# Patient Record
Sex: FEMALE | Race: WHITE | NOT HISPANIC OR LATINO
[De-identification: names, ages, dates, MRNs, and addresses within clinical notes are randomized per-mention and may not be internally consistent; named-entity substitution may affect disease eponyms.]

---

## 2019-10-21 ENCOUNTER — RECORD ABSTRACTING (OUTPATIENT)
Age: 19
End: 2019-10-21

## 2019-10-21 DIAGNOSIS — Z92.89 PERSONAL HISTORY OF OTHER MEDICAL TREATMENT: ICD-10-CM

## 2019-10-21 DIAGNOSIS — Z87.42 PERSONAL HISTORY OF OTHER DISEASES OF THE FEMALE GENITAL TRACT: ICD-10-CM

## 2019-10-21 DIAGNOSIS — Z87.2 PERSONAL HISTORY OF DISEASES OF THE SKIN AND SUBCUTANEOUS TISSUE: ICD-10-CM

## 2019-10-21 DIAGNOSIS — I73.00 RAYNAUD'S SYNDROME W/OUT GANGRENE: ICD-10-CM

## 2019-10-21 DIAGNOSIS — Z83.2 FAMILY HISTORY OF DISEASES OF THE BLOOD AND BLOOD-FORMING ORGANS AND CERTAIN DISORDERS INVOLVING THE IMMUNE MECHANISM: ICD-10-CM

## 2019-10-21 DIAGNOSIS — N91.2 AMENORRHEA, UNSPECIFIED: ICD-10-CM

## 2019-10-21 DIAGNOSIS — Z80.3 FAMILY HISTORY OF MALIGNANT NEOPLASM OF BREAST: ICD-10-CM

## 2019-10-21 PROBLEM — Z00.00 ENCOUNTER FOR PREVENTIVE HEALTH EXAMINATION: Status: ACTIVE | Noted: 2019-10-21

## 2019-10-21 RX ORDER — NORGESTIMATE AND ETHINYL ESTRADIOL 7DAYSX3 28
KIT ORAL
Refills: 0 | Status: ACTIVE | COMMUNITY

## 2020-04-08 ENCOUNTER — RX RENEWAL (OUTPATIENT)
Age: 20
End: 2020-04-08

## 2020-05-20 ENCOUNTER — TRANSCRIPTION ENCOUNTER (OUTPATIENT)
Age: 20
End: 2020-05-20

## 2020-07-14 ENCOUNTER — APPOINTMENT (OUTPATIENT)
Dept: OBGYN | Facility: CLINIC | Age: 20
End: 2020-07-14
Payer: COMMERCIAL

## 2020-07-14 VITALS
DIASTOLIC BLOOD PRESSURE: 70 MMHG | SYSTOLIC BLOOD PRESSURE: 102 MMHG | HEIGHT: 64 IN | BODY MASS INDEX: 25.95 KG/M2 | TEMPERATURE: 98.3 F | HEART RATE: 68 BPM | WEIGHT: 152 LBS

## 2020-07-14 DIAGNOSIS — Z78.9 OTHER SPECIFIED HEALTH STATUS: ICD-10-CM

## 2020-07-14 PROCEDURE — 99385 PREV VISIT NEW AGE 18-39: CPT

## 2020-07-14 NOTE — CHIEF COMPLAINT
[Annual Visit] : annual visit [FreeTextEntry1] : Patient is here for annual exam , on birth control pills  doing well , needs refills to [facility]  , family hx breast Ca mom with BRCA - negative , no complaints

## 2020-07-14 NOTE — HISTORY OF PRESENT ILLNESS
[Definite:  ___ (Date)] : the last menstrual period was [unfilled] [Menarche Age: ____] : age at menarche was [unfilled] [Oral Contraceptives] : uses oral contraceptives [Contraception] : uses contraception [Yes] : yes [1 Year Ago] : 1 year ago [Good] : being in good health [Reproductive Age] : is of reproductive age [Up to Date] : up to date with ~his/her~ immunizations [Sexually Active] : is not sexually active

## 2020-07-14 NOTE — DISCUSSION/SUMMARY
[FreeTextEntry1] : Patient here for annual exam. Doing well on oral contraceptives no complaints.\par \par Mei Rankin M.D.\par

## 2020-07-16 LAB
C TRACH RRNA SPEC QL NAA+PROBE: NOT DETECTED
HPV HIGH+LOW RISK DNA PNL CVX: NOT DETECTED
N GONORRHOEA RRNA SPEC QL NAA+PROBE: NOT DETECTED
SOURCE AMPLIFICATION: NORMAL

## 2021-01-20 RX ORDER — NORGESTIMATE AND ETHINYL ESTRADIOL 7DAYSX3 28
0.18/0.215/0.25 KIT ORAL
Qty: 1 | Refills: 0 | Status: ACTIVE | COMMUNITY
Start: 2021-01-20 | End: 1900-01-01

## 2021-01-20 RX ORDER — NORGESTIMATE AND ETHINYL ESTRADIOL 7DAYSX3 28
0.18/0.215/0.25 KIT ORAL DAILY
Qty: 3 | Refills: 1 | Status: ACTIVE | COMMUNITY
Start: 2021-01-20 | End: 1900-01-01

## 2021-06-28 ENCOUNTER — TRANSCRIPTION ENCOUNTER (OUTPATIENT)
Age: 21
End: 2021-06-28

## 2021-07-22 ENCOUNTER — NON-APPOINTMENT (OUTPATIENT)
Age: 21
End: 2021-07-22

## 2021-07-22 ENCOUNTER — APPOINTMENT (OUTPATIENT)
Dept: OBGYN | Facility: CLINIC | Age: 21
End: 2021-07-22
Payer: COMMERCIAL

## 2021-07-22 VITALS
SYSTOLIC BLOOD PRESSURE: 118 MMHG | BODY MASS INDEX: 25.1 KG/M2 | WEIGHT: 147 LBS | HEART RATE: 69 BPM | HEIGHT: 64 IN | DIASTOLIC BLOOD PRESSURE: 78 MMHG | TEMPERATURE: 98 F

## 2021-07-22 DIAGNOSIS — Z80.0 FAMILY HISTORY OF MALIGNANT NEOPLASM OF DIGESTIVE ORGANS: ICD-10-CM

## 2021-07-22 PROCEDURE — 99395 PREV VISIT EST AGE 18-39: CPT

## 2021-07-22 RX ORDER — NORGESTIMATE AND ETHINYL ESTRADIOL 7DAYSX3 28
0.18/0.215/0.25 KIT ORAL
Qty: 84 | Refills: 3 | Status: ACTIVE | COMMUNITY
Start: 2020-04-08 | End: 1900-01-01

## 2021-07-22 NOTE — DISCUSSION/SUMMARY
[FreeTextEntry1] : 20 yo p0 for annual exam , ocp refill,  h/o irregular periods\par pap hpv\par  ocp refill\par

## 2021-07-22 NOTE — HISTORY OF PRESENT ILLNESS
[Patient reported PAP Smear was normal] : Patient reported PAP Smear was normal [Menarche Age: ____] : age at menarche was [unfilled] [No] : Patient does not have concerns regarding sex [Currently Active] : currently active [Y] : Patient is sexually active [N] : Patient denies prior pregnancies [TextBox_4] : gynhx\par  no h/o firboids cyst or std\par  11/irreg q 2-4 mo, regular on ocp - / lasting 4 days\par  on ocp since 2018\par gardasil vaccine\par  mother  BRCA neg ( had br cancer double mastectomy at 40's, grandpa colon ca in 40's)\par  [PapSmeardate] : 7-2020 [PGHxTotal] : 0 [PGxLiving] : o [FreeTextEntry1] : 06-

## 2021-07-22 NOTE — PHYSICAL EXAM
[Appropriately responsive] : appropriately responsive [Alert] : alert [No Acute Distress] : no acute distress [No Lymphadenopathy] : no lymphadenopathy [Soft] : soft [Non-tender] : non-tender [Non-distended] : non-distended [No HSM] : No HSM [No Lesions] : no lesions [No Mass] : no mass [Oriented x3] : oriented x3 [FreeTextEntry6] : wnl [Examination Of The Breasts] : a normal appearance [No Discharge] : no discharge [No Masses] : no breast masses were palpable [Labia Majora] : normal [Labia Minora] : normal [Normal] : normal [Uterine Adnexae] : normal [FreeTextEntry5] : white discharge, very limited exam with pediatric speculum , single digit pelvic exam ,limited as patient is voluntary guarding, virgin- she has no complains

## 2021-07-27 LAB — HPV HIGH+LOW RISK DNA PNL CVX: NOT DETECTED

## 2021-08-02 LAB — CYTOLOGY CVX/VAG DOC THIN PREP: NORMAL

## 2021-11-03 ENCOUNTER — TRANSCRIPTION ENCOUNTER (OUTPATIENT)
Age: 21
End: 2021-11-03

## 2022-06-07 ENCOUNTER — RX RENEWAL (OUTPATIENT)
Age: 22
End: 2022-06-07

## 2022-10-08 ENCOUNTER — EMERGENCY (EMERGENCY)
Facility: HOSPITAL | Age: 22
LOS: 0 days | Discharge: HOME | End: 2022-10-08
Attending: EMERGENCY MEDICINE | Admitting: EMERGENCY MEDICINE

## 2022-10-08 VITALS
SYSTOLIC BLOOD PRESSURE: 117 MMHG | RESPIRATION RATE: 18 BRPM | HEART RATE: 138 BPM | DIASTOLIC BLOOD PRESSURE: 62 MMHG | OXYGEN SATURATION: 99 % | WEIGHT: 147.05 LBS | TEMPERATURE: 103 F

## 2022-10-08 VITALS
RESPIRATION RATE: 18 BRPM | HEART RATE: 99 BPM | TEMPERATURE: 100 F | SYSTOLIC BLOOD PRESSURE: 109 MMHG | DIASTOLIC BLOOD PRESSURE: 60 MMHG | OXYGEN SATURATION: 96 %

## 2022-10-08 DIAGNOSIS — R00.0 TACHYCARDIA, UNSPECIFIED: ICD-10-CM

## 2022-10-08 DIAGNOSIS — R42 DIZZINESS AND GIDDINESS: ICD-10-CM

## 2022-10-08 DIAGNOSIS — N39.0 URINARY TRACT INFECTION, SITE NOT SPECIFIED: ICD-10-CM

## 2022-10-08 DIAGNOSIS — R50.9 FEVER, UNSPECIFIED: ICD-10-CM

## 2022-10-08 LAB
ALBUMIN SERPL ELPH-MCNC: 4.2 G/DL — SIGNIFICANT CHANGE UP (ref 3.5–5.2)
ALP SERPL-CCNC: 37 U/L — SIGNIFICANT CHANGE UP (ref 30–115)
ALT FLD-CCNC: 28 U/L — SIGNIFICANT CHANGE UP (ref 0–41)
ANION GAP SERPL CALC-SCNC: 12 MMOL/L — SIGNIFICANT CHANGE UP (ref 7–14)
APPEARANCE UR: ABNORMAL
AST SERPL-CCNC: 41 U/L — SIGNIFICANT CHANGE UP (ref 0–41)
BACTERIA # UR AUTO: ABNORMAL
BASOPHILS # BLD AUTO: 0.01 K/UL — SIGNIFICANT CHANGE UP (ref 0–0.2)
BASOPHILS NFR BLD AUTO: 0.2 % — SIGNIFICANT CHANGE UP (ref 0–1)
BILIRUB SERPL-MCNC: 0.4 MG/DL — SIGNIFICANT CHANGE UP (ref 0.2–1.2)
BILIRUB UR-MCNC: NEGATIVE — SIGNIFICANT CHANGE UP
BUN SERPL-MCNC: 10 MG/DL — SIGNIFICANT CHANGE UP (ref 10–20)
CALCIUM SERPL-MCNC: 8.8 MG/DL — SIGNIFICANT CHANGE UP (ref 8.4–10.5)
CHLORIDE SERPL-SCNC: 99 MMOL/L — SIGNIFICANT CHANGE UP (ref 98–110)
CO2 SERPL-SCNC: 24 MMOL/L — SIGNIFICANT CHANGE UP (ref 17–32)
COLOR SPEC: SIGNIFICANT CHANGE UP
CREAT SERPL-MCNC: 1 MG/DL — SIGNIFICANT CHANGE UP (ref 0.7–1.5)
DIFF PNL FLD: ABNORMAL
EGFR: 81 ML/MIN/1.73M2 — SIGNIFICANT CHANGE UP
EOSINOPHIL # BLD AUTO: 0 K/UL — SIGNIFICANT CHANGE UP (ref 0–0.7)
EOSINOPHIL NFR BLD AUTO: 0 % — SIGNIFICANT CHANGE UP (ref 0–8)
EPI CELLS # UR: 3 /HPF — SIGNIFICANT CHANGE UP (ref 0–5)
GLUCOSE SERPL-MCNC: 124 MG/DL — HIGH (ref 70–99)
GLUCOSE UR QL: NEGATIVE — SIGNIFICANT CHANGE UP
HCG SERPL QL: NEGATIVE — SIGNIFICANT CHANGE UP
HCT VFR BLD CALC: 37.7 % — SIGNIFICANT CHANGE UP (ref 37–47)
HGB BLD-MCNC: 12.2 G/DL — SIGNIFICANT CHANGE UP (ref 12–16)
HYALINE CASTS # UR AUTO: 4 /LPF — SIGNIFICANT CHANGE UP (ref 0–7)
IMM GRANULOCYTES NFR BLD AUTO: 0.4 % — HIGH (ref 0.1–0.3)
KETONES UR-MCNC: NEGATIVE — SIGNIFICANT CHANGE UP
LEUKOCYTE ESTERASE UR-ACNC: ABNORMAL
LYMPHOCYTES # BLD AUTO: 0.38 K/UL — LOW (ref 1.2–3.4)
LYMPHOCYTES # BLD AUTO: 7.9 % — LOW (ref 20.5–51.1)
MCHC RBC-ENTMCNC: 23.4 PG — LOW (ref 27–31)
MCHC RBC-ENTMCNC: 32.4 G/DL — SIGNIFICANT CHANGE UP (ref 32–37)
MCV RBC AUTO: 72.2 FL — LOW (ref 81–99)
MONOCYTES # BLD AUTO: 0.34 K/UL — SIGNIFICANT CHANGE UP (ref 0.1–0.6)
MONOCYTES NFR BLD AUTO: 7.1 % — SIGNIFICANT CHANGE UP (ref 1.7–9.3)
NEUTROPHILS # BLD AUTO: 4.04 K/UL — SIGNIFICANT CHANGE UP (ref 1.4–6.5)
NEUTROPHILS NFR BLD AUTO: 84.4 % — HIGH (ref 42.2–75.2)
NITRITE UR-MCNC: NEGATIVE — SIGNIFICANT CHANGE UP
NRBC # BLD: 0 /100 WBCS — SIGNIFICANT CHANGE UP (ref 0–0)
PH UR: 7 — SIGNIFICANT CHANGE UP (ref 5–8)
PLATELET # BLD AUTO: 190 K/UL — SIGNIFICANT CHANGE UP (ref 130–400)
POTASSIUM SERPL-MCNC: 4.4 MMOL/L — SIGNIFICANT CHANGE UP (ref 3.5–5)
POTASSIUM SERPL-SCNC: 4.4 MMOL/L — SIGNIFICANT CHANGE UP (ref 3.5–5)
PROT SERPL-MCNC: 6.8 G/DL — SIGNIFICANT CHANGE UP (ref 6–8)
PROT UR-MCNC: SIGNIFICANT CHANGE UP
RBC # BLD: 5.22 M/UL — SIGNIFICANT CHANGE UP (ref 4.2–5.4)
RBC # FLD: 21.1 % — HIGH (ref 11.5–14.5)
RBC CASTS # UR COMP ASSIST: 10 /HPF — HIGH (ref 0–4)
SODIUM SERPL-SCNC: 135 MMOL/L — SIGNIFICANT CHANGE UP (ref 135–146)
SP GR SPEC: 1.01 — SIGNIFICANT CHANGE UP (ref 1.01–1.03)
TROPONIN T SERPL-MCNC: <0.01 NG/ML — SIGNIFICANT CHANGE UP
UROBILINOGEN FLD QL: SIGNIFICANT CHANGE UP
WBC # BLD: 4.79 K/UL — LOW (ref 4.8–10.8)
WBC # FLD AUTO: 4.79 K/UL — LOW (ref 4.8–10.8)
WBC UR QL: 23 /HPF — HIGH (ref 0–5)

## 2022-10-08 PROCEDURE — 99285 EMERGENCY DEPT VISIT HI MDM: CPT

## 2022-10-08 PROCEDURE — 93010 ELECTROCARDIOGRAM REPORT: CPT

## 2022-10-08 PROCEDURE — 71045 X-RAY EXAM CHEST 1 VIEW: CPT | Mod: 26

## 2022-10-08 RX ORDER — CEFDINIR 250 MG/5ML
1 POWDER, FOR SUSPENSION ORAL
Qty: 14 | Refills: 0
Start: 2022-10-08 | End: 2022-10-14

## 2022-10-08 RX ORDER — ACETAMINOPHEN 500 MG
650 TABLET ORAL ONCE
Refills: 0 | Status: COMPLETED | OUTPATIENT
Start: 2022-10-08 | End: 2022-10-08

## 2022-10-08 RX ORDER — SODIUM CHLORIDE 9 MG/ML
1000 INJECTION, SOLUTION INTRAVENOUS ONCE
Refills: 0 | Status: COMPLETED | OUTPATIENT
Start: 2022-10-08 | End: 2022-10-08

## 2022-10-08 RX ORDER — CEFTRIAXONE 500 MG/1
1000 INJECTION, POWDER, FOR SOLUTION INTRAMUSCULAR; INTRAVENOUS ONCE
Refills: 0 | Status: COMPLETED | OUTPATIENT
Start: 2022-10-08 | End: 2022-10-08

## 2022-10-08 RX ADMIN — Medication 650 MILLIGRAM(S): at 13:00

## 2022-10-08 RX ADMIN — SODIUM CHLORIDE 1000 MILLILITER(S): 9 INJECTION, SOLUTION INTRAVENOUS at 10:31

## 2022-10-08 RX ADMIN — CEFTRIAXONE 100 MILLIGRAM(S): 500 INJECTION, POWDER, FOR SOLUTION INTRAMUSCULAR; INTRAVENOUS at 13:30

## 2022-10-08 RX ADMIN — Medication 650 MILLIGRAM(S): at 10:31

## 2022-10-08 NOTE — ED PROVIDER NOTE - CLINICAL SUMMARY MEDICAL DECISION MAKING FREE TEXT BOX
22-year-old female without any pertinent past medical history presented for evaluation of fever for the past 2 days.  Patient denies any additional symptoms, says that she has been eating and drinking less for the past day, this morning got up from bed felt lightheaded went to the bathroom and felt like passing out, her mom caught her sat on a chair and told her to put her head between the legs.  Patient states that her urine is somewhat dark in color but denies any headache, sore throat, congestion, cough, shortness of breath, CP, abdominal /flank pain, dysuria/hematuria frequency or urgency, vaginal discharge, skin rash or any other acute complaints.  No recent travel, her sister had URI-like symptoms which have resolved.   Well-appearing well-nourished, NAD, head AT/NC, PERRL, pink conjunctivae,  mmm, nml oropharynx, nml phonation without drooling or trismus, supple neck , nml work of breathing, lungs CTA b/l, equal air entry, RRR, well-perfused extremities, distal pulses intact, abdomen soft, NT/ND, BS present in all quadrants, no midline spine or CVA ttp, no leg edema or unilateral calf swelling, A&Ox3, no focal neuro deficits, nml mood and affect.  Patient was found to be febrile in ED, Plan: IVF hydration, antipyretics, labs, reassess.  Patient and her father are amenable to the plan.

## 2022-10-08 NOTE — ED PROVIDER NOTE - PROGRESS NOTE DETAILS
EP: The patient appears very well, appears positive, antibiotics given, she is a good candidate for outpatient antibiotic treatment.  Strict return precautions given, advised to follow with her primary care doctor in 1-4 days. Patient to be discharged from ED. Any available test results were discussed with patient and/or family. Verbal instructions given, including instructions to return to ED immediately for any new, worsening, or concerning symptoms. Patient endorsed understanding. Written discharge instructions additionally given, including follow-up plan.  Patient was given opportunity to ask questions.

## 2022-10-08 NOTE — ED ADULT TRIAGE NOTE - CHIEF COMPLAINT QUOTE
pt stated she has had fevers since thursday night, highest 103. pt with no other symptoms. pt stated she passed out this morning and felt dizzy with the fever.

## 2022-10-08 NOTE — ED ADULT NURSE NOTE - NSIMPLEMENTINTERV_GEN_ALL_ED
Implemented All Universal Safety Interventions:  [place] to call system. Call bell, personal items and telephone within reach. Instruct patient to call for assistance. Room bathroom lighting operational. Non-slip footwear when patient is off stretcher. Physically safe environment: no spills, clutter or unnecessary equipment. Stretcher in lowest position, wheels locked, appropriate side rails in place.

## 2022-10-08 NOTE — ED PROVIDER NOTE - PATIENT PORTAL LINK FT
You can access the FollowMyHealth Patient Portal offered by [facility] by registering at the following website: [email or web address]. By joining [employer]’s FollowMyHealth portal, you will also be able to view your health information using other applications (apps) compatible with our system.

## 2022-10-08 NOTE — ED PROVIDER NOTE - OBJECTIVE STATEMENT
22-year-old female coming in here for fever and lightheadedness.  She states that she has had fever for the past 2 days.  Denies chest pain shortness of breath headaches nausea vomiting or urinary complaints.  Patient states that she was walking to the bathroom but then felt lightheaded and had to sit down.  Patient never fell or hit her head.  No other complaints.

## 2022-10-18 ENCOUNTER — NON-APPOINTMENT (OUTPATIENT)
Age: 22
End: 2022-10-18

## 2023-03-05 ENCOUNTER — EMERGENCY (EMERGENCY)
Facility: HOSPITAL | Age: 23
LOS: 0 days | Discharge: HOME | End: 2023-03-05
Attending: EMERGENCY MEDICINE
Payer: COMMERCIAL

## 2023-03-05 VITALS
DIASTOLIC BLOOD PRESSURE: 66 MMHG | HEART RATE: 76 BPM | OXYGEN SATURATION: 100 % | TEMPERATURE: 98 F | RESPIRATION RATE: 18 BRPM | SYSTOLIC BLOOD PRESSURE: 128 MMHG

## 2023-03-05 VITALS
TEMPERATURE: 99 F | HEIGHT: 64 IN | DIASTOLIC BLOOD PRESSURE: 83 MMHG | HEART RATE: 110 BPM | RESPIRATION RATE: 17 BRPM | OXYGEN SATURATION: 99 % | WEIGHT: 138.01 LBS | SYSTOLIC BLOOD PRESSURE: 138 MMHG

## 2023-03-05 DIAGNOSIS — R10.2 PELVIC AND PERINEAL PAIN: ICD-10-CM

## 2023-03-05 DIAGNOSIS — N93.9 ABNORMAL UTERINE AND VAGINAL BLEEDING, UNSPECIFIED: ICD-10-CM

## 2023-03-05 DIAGNOSIS — R10.9 UNSPECIFIED ABDOMINAL PAIN: ICD-10-CM

## 2023-03-05 DIAGNOSIS — Z79.3 LONG TERM (CURRENT) USE OF HORMONAL CONTRACEPTIVES: ICD-10-CM

## 2023-03-05 LAB
ALBUMIN SERPL ELPH-MCNC: 4.3 G/DL — SIGNIFICANT CHANGE UP (ref 3.5–5.2)
ALP SERPL-CCNC: 38 U/L — SIGNIFICANT CHANGE UP (ref 30–115)
ALT FLD-CCNC: 14 U/L — SIGNIFICANT CHANGE UP (ref 0–41)
ANION GAP SERPL CALC-SCNC: 9 MMOL/L — SIGNIFICANT CHANGE UP (ref 7–14)
APPEARANCE UR: CLEAR — SIGNIFICANT CHANGE UP
AST SERPL-CCNC: 16 U/L — SIGNIFICANT CHANGE UP (ref 0–41)
BACTERIA # UR AUTO: NEGATIVE — SIGNIFICANT CHANGE UP
BASOPHILS # BLD AUTO: 0.01 K/UL — SIGNIFICANT CHANGE UP (ref 0–0.2)
BASOPHILS NFR BLD AUTO: 0.1 % — SIGNIFICANT CHANGE UP (ref 0–1)
BILIRUB SERPL-MCNC: 0.5 MG/DL — SIGNIFICANT CHANGE UP (ref 0.2–1.2)
BILIRUB UR-MCNC: NEGATIVE — SIGNIFICANT CHANGE UP
BUN SERPL-MCNC: 8 MG/DL — LOW (ref 10–20)
CALCIUM SERPL-MCNC: 9.7 MG/DL — SIGNIFICANT CHANGE UP (ref 8.4–10.5)
CHLORIDE SERPL-SCNC: 102 MMOL/L — SIGNIFICANT CHANGE UP (ref 98–110)
CO2 SERPL-SCNC: 26 MMOL/L — SIGNIFICANT CHANGE UP (ref 17–32)
COLOR SPEC: COLORLESS — SIGNIFICANT CHANGE UP
CREAT SERPL-MCNC: 0.7 MG/DL — SIGNIFICANT CHANGE UP (ref 0.7–1.5)
DIFF PNL FLD: ABNORMAL
EGFR: 124 ML/MIN/1.73M2 — SIGNIFICANT CHANGE UP
EOSINOPHIL # BLD AUTO: 0.01 K/UL — SIGNIFICANT CHANGE UP (ref 0–0.7)
EOSINOPHIL NFR BLD AUTO: 0.1 % — SIGNIFICANT CHANGE UP (ref 0–8)
EPI CELLS # UR: 1 /HPF — SIGNIFICANT CHANGE UP (ref 0–5)
GLUCOSE SERPL-MCNC: 90 MG/DL — SIGNIFICANT CHANGE UP (ref 70–99)
GLUCOSE UR QL: NEGATIVE — SIGNIFICANT CHANGE UP
HCT VFR BLD CALC: 37.9 % — SIGNIFICANT CHANGE UP (ref 37–47)
HGB BLD-MCNC: 12.4 G/DL — SIGNIFICANT CHANGE UP (ref 12–16)
HYALINE CASTS # UR AUTO: 0 /LPF — SIGNIFICANT CHANGE UP (ref 0–7)
IMM GRANULOCYTES NFR BLD AUTO: 0.1 % — SIGNIFICANT CHANGE UP (ref 0.1–0.3)
KETONES UR-MCNC: NEGATIVE — SIGNIFICANT CHANGE UP
LACTATE SERPL-SCNC: 1 MMOL/L — SIGNIFICANT CHANGE UP (ref 0.7–2)
LEUKOCYTE ESTERASE UR-ACNC: ABNORMAL
LIDOCAIN IGE QN: 20 U/L — SIGNIFICANT CHANGE UP (ref 7–60)
LYMPHOCYTES # BLD AUTO: 0.91 K/UL — LOW (ref 1.2–3.4)
LYMPHOCYTES # BLD AUTO: 13.5 % — LOW (ref 20.5–51.1)
MCHC RBC-ENTMCNC: 23.5 PG — LOW (ref 27–31)
MCHC RBC-ENTMCNC: 32.7 G/DL — SIGNIFICANT CHANGE UP (ref 32–37)
MCV RBC AUTO: 71.8 FL — LOW (ref 81–99)
MONOCYTES # BLD AUTO: 0.47 K/UL — SIGNIFICANT CHANGE UP (ref 0.1–0.6)
MONOCYTES NFR BLD AUTO: 7 % — SIGNIFICANT CHANGE UP (ref 1.7–9.3)
NEUTROPHILS # BLD AUTO: 5.33 K/UL — SIGNIFICANT CHANGE UP (ref 1.4–6.5)
NEUTROPHILS NFR BLD AUTO: 79.2 % — HIGH (ref 42.2–75.2)
NITRITE UR-MCNC: NEGATIVE — SIGNIFICANT CHANGE UP
NRBC # BLD: 0 /100 WBCS — SIGNIFICANT CHANGE UP (ref 0–0)
PH UR: 6.5 — SIGNIFICANT CHANGE UP (ref 5–8)
PLATELET # BLD AUTO: 280 K/UL — SIGNIFICANT CHANGE UP (ref 130–400)
POTASSIUM SERPL-MCNC: 4.4 MMOL/L — SIGNIFICANT CHANGE UP (ref 3.5–5)
POTASSIUM SERPL-SCNC: 4.4 MMOL/L — SIGNIFICANT CHANGE UP (ref 3.5–5)
PROT SERPL-MCNC: 6.8 G/DL — SIGNIFICANT CHANGE UP (ref 6–8)
PROT UR-MCNC: NEGATIVE — SIGNIFICANT CHANGE UP
RBC # BLD: 5.28 M/UL — SIGNIFICANT CHANGE UP (ref 4.2–5.4)
RBC # FLD: 21.4 % — HIGH (ref 11.5–14.5)
RBC CASTS # UR COMP ASSIST: 3 /HPF — SIGNIFICANT CHANGE UP (ref 0–4)
SODIUM SERPL-SCNC: 137 MMOL/L — SIGNIFICANT CHANGE UP (ref 135–146)
SP GR SPEC: 1 — LOW (ref 1.01–1.03)
UROBILINOGEN FLD QL: SIGNIFICANT CHANGE UP
WBC # BLD: 6.74 K/UL — SIGNIFICANT CHANGE UP (ref 4.8–10.8)
WBC # FLD AUTO: 6.74 K/UL — SIGNIFICANT CHANGE UP (ref 4.8–10.8)
WBC UR QL: 4 /HPF — SIGNIFICANT CHANGE UP (ref 0–5)

## 2023-03-05 PROCEDURE — 76830 TRANSVAGINAL US NON-OB: CPT | Mod: 26

## 2023-03-05 PROCEDURE — 96374 THER/PROPH/DIAG INJ IV PUSH: CPT

## 2023-03-05 PROCEDURE — 99284 EMERGENCY DEPT VISIT MOD MDM: CPT | Mod: 25

## 2023-03-05 PROCEDURE — 99284 EMERGENCY DEPT VISIT MOD MDM: CPT

## 2023-03-05 PROCEDURE — 36415 COLL VENOUS BLD VENIPUNCTURE: CPT

## 2023-03-05 PROCEDURE — 83690 ASSAY OF LIPASE: CPT

## 2023-03-05 PROCEDURE — 80053 COMPREHEN METABOLIC PANEL: CPT

## 2023-03-05 PROCEDURE — 76830 TRANSVAGINAL US NON-OB: CPT

## 2023-03-05 PROCEDURE — 83605 ASSAY OF LACTIC ACID: CPT

## 2023-03-05 PROCEDURE — 87086 URINE CULTURE/COLONY COUNT: CPT

## 2023-03-05 PROCEDURE — 81001 URINALYSIS AUTO W/SCOPE: CPT

## 2023-03-05 PROCEDURE — 85025 COMPLETE CBC W/AUTO DIFF WBC: CPT

## 2023-03-05 RX ORDER — SODIUM CHLORIDE 9 MG/ML
1000 INJECTION, SOLUTION INTRAVENOUS ONCE
Refills: 0 | Status: COMPLETED | OUTPATIENT
Start: 2023-03-05 | End: 2023-03-05

## 2023-03-05 RX ORDER — FAMOTIDINE 10 MG/ML
20 INJECTION INTRAVENOUS ONCE
Refills: 0 | Status: COMPLETED | OUTPATIENT
Start: 2023-03-05 | End: 2023-03-05

## 2023-03-05 RX ADMIN — FAMOTIDINE 20 MILLIGRAM(S): 10 INJECTION INTRAVENOUS at 19:59

## 2023-03-05 RX ADMIN — SODIUM CHLORIDE 1000 MILLILITER(S): 9 INJECTION, SOLUTION INTRAVENOUS at 19:59

## 2023-03-05 NOTE — ED PROVIDER NOTE - PATIENT PORTAL LINK FT
You can access the FollowMyHealth Patient Portal offered by [facility] by registering at the following website: [email or web address]. By joining [employer]’s FollowMyHealth portal, you will also be able to view your health information using other applications (apps) compatible with our system.

## 2023-03-05 NOTE — ED PROVIDER NOTE - OBJECTIVE STATEMENT
23 y/o female with no sig pmhx in ER with c/o VB and abdominal pain.  Pt states she had mid-abdominal pain yesterday, then started having VB.  LMP was 2 weeks ago, is on OCP's.  today having some pelvic cramping, VB is now slowing down.  +N, V last night, not today.  no diarrhea.  no f/c.  no urinary symptoms.  no cp/sob.  no ha/dizziness/loc.

## 2023-03-05 NOTE — ED PROVIDER NOTE - MDM PATIENT STATEMENT FOR ADDL TREATMENT
Vaccine status unknown Patient with one or more new problems requiring additional work-up/treatment.

## 2023-03-05 NOTE — ED PROVIDER NOTE - CLINICAL SUMMARY MEDICAL DECISION MAKING FREE TEXT BOX
22-year-old female in ER with c/o VB and abdominal pain x2 days.  LMP 2 weeks ago, on OCPs.  VB now slowing down.  Labs reviewed: WBC 6, Hgb 12.4, CMP unremarkable, UA: Moderate LE, negative bacteria, urine culture sent and pending.  Pelvic sono unremarkable.  Results of labs and imaging discussed with patient.  Patient to follow-up with her GYN doctor, told to return to ER for increased bleeding, pain, fever, vomiting, or any other new/concerning symptom.  Patient understands and agrees with plan.

## 2023-03-05 NOTE — ED PROVIDER NOTE - ANTIBIOTIC MEDICATION DECIDED AGAINST BECAUSE
Antibiotics considered for UA with moderate LE, however no bacteria in urine and patient with no urinary symptoms.  Will defer antibiotics pending results of urine culture.

## 2023-03-05 NOTE — ED PROVIDER NOTE - NSFOLLOWUPINSTRUCTIONS_ED_ALL_ED_FT
Abnormal Uterine Bleeding    A female body showing the reproductive organs, with a close-up view of the uterus and vagina.   Abnormal uterine bleeding is unusual bleeding from the uterus. It includes bleeding after sex, or bleeding or spotting between menstrual periods. It may also include bleeding that is heavier than normal, menstrual periods that last longer than usual, or bleeding that occurs after menopause.    Abnormal uterine bleeding can affect teenagers, women in their reproductive years, pregnant women, and women who have reached menopause. Common causes of abnormal uterine bleeding include:  •Pregnancy.      •Abnormal growths within the lining of the uterus (polyps).      •Benign tumors or growths in the uterus (fibroids). These are not cancer.      •Infection.      •Cancer.      •Too much or too little of some hormones in the body (hormonal imbalances).      Any type of abnormal bleeding should be checked by a health care provider. Many cases are minor and simple to treat, but others may be more serious. Treatment will depend on the cause of the bleeding and how severe it is.      Follow these instructions at home:    Medicines     •Take over-the-counter and prescription medicines only as told by your health care provider.    •Ask your health care provider about:  •Taking medicines such as aspirin and ibuprofen. These medicines can thin your blood. Do not take these medicines unless your health care provider tells you to take them.      •Taking over-the-counter medicines, vitamins, herbs, and supplements.        •If you were prescribed iron pills, take them as told by your health care provider. Iron pills help to replace iron that your body loses because of this condition.      Managing constipation     In cases of severe bleeding, you may be asked to increase your iron intake to treat anemia. Doing this may cause constipation. To prevent or treat constipation, you may need to:  •Drink enough fluid to keep your urine pale yellow.      •Take over-the-counter or prescription medicines.      •Eat foods that are high in fiber, such as beans, whole grains, and fresh fruits and vegetables.      •Limit foods that are high in fat and processed sugars, such as fried or sweet foods.      Activity    Alter your activity to decrease bleeding if you need to change your sanitary pad more than one time every 2 hours:  •Lie in bed with your feet raised (elevated).      •Place a cold pack on your lower abdomen.      •Rest as much as possible until the bleeding stops or slows down.      General instructions    •Do not use tampons, douche, or have sex until your health care provider says these things are okay.      •Change your sanitary pads often.      •Get regular exams. These include pelvic exams and cervical cancer screenings.      •It is up to you to get the results of any tests that are done. Ask your health care provider, or the department that is doing the tests, when your results will be ready.    •Monitor your condition for any changes. For 2 months, write down:  •When your menstrual period starts.      •When your menstrual period ends.      •When any abnormal vaginal bleeding occurs.      •What problems you notice.        •Keep all follow-up visits. This is important.        Contact a health care provider if:    •You have bleeding that lasts for more than one week.      •You feel dizzy at times.      •You feel nauseous or you vomit.      •You feel light-headed or weak.      •You notice any other changes that show that your condition is getting worse.        Get help right away if:    •You faint.      •You have bleeding that soaks through a sanitary pad every hour.      •You have pain in the abdomen.      •You have a fever or chills.      •You become sweaty or weak.      •You pass large blood clots from your vagina.      These symptoms may represent a serious problem that is an emergency. Do not wait to see if the symptoms will go away. Get medical help right away. Call your local emergency services (911 in the [place] Do not drive yourself to the hospital.       Summary    •Abnormal uterine bleeding is unusual bleeding from the uterus.      •Any type of abnormal bleeding should be checked by a health care provider. Many cases are minor and simple to treat, but others may be more serious.      •Treatment will depend on the cause of the bleeding and how severe it is.      •Get help right away if you faint, you have bleeding that soaks through a sanitary pad every hour, or you pass large blood clots from your vagina.      This information is not intended to replace advice given to you by your health care provider. Make sure you discuss any questions you have with your health care provider.

## 2023-03-05 NOTE — ED PROVIDER NOTE - CONSIDERATION OF ADMISSION OBSERVATION
Consideration of Admission/Observation Hospitalization was considered for this patient, but the workup and data did not indicate that hospitalization was necessary.

## 2023-03-06 LAB
CULTURE RESULTS: SIGNIFICANT CHANGE UP
SPECIMEN SOURCE: SIGNIFICANT CHANGE UP

## 2023-07-13 NOTE — ED PROVIDER NOTE - DISCHARGE DATE
PHYSICIAN NOTE:  This visit was completed in person at the [facility].      I had the pleasure of seeing Ms. Janessa Melendez for evaluation of AF and NSVT.  She has been referred to EP by my colleagues Rosemarie Haque NP and Dr. Brett Smith.    Very pleasant 79-year-old female with history of idiopathic cardiomyopathy (EF has ranged between 40% and 55% over the years, recent cardiac catheterization with trivial CAD), atrial fibrillation with cardioembolic stroke (see details below), dyslipidemia, white-coat hypertension.      Initial diagnosis of AF was in 11/2022 when she presented with acute left facial droop, left-sided weakness and slurred speech.  She was diagnosed with acute R MCA stroke (M1 branch).  He was in AF with RVR.  She was placed on digoxin.    In 03/2023 she was hospitalized with bronchitis and volume overload.  EF was 45-50%.  A Holter monitor after discharge showed AF throughout with average rate 53, range 40-75.  Digoxin was stopped.      In 04/2023 she was admitted with dizziness.  AF with rate in the 50s was noted on her ECG.  Brain MRI was negative for acute process.  She was hydrated and symptoms improved.    Later, carvedilol was decreased from 50 mg twice daily to 25 mg twice daily.   A Holter monitor was repeated.  I reviewed it today.  It shows AF throughout, average rate 56, 1351 pauses between 2 and 2.6 seconds.  There was a 34 beat run of monomorphic VT at 190 bpm.    Today the patient came to our clinic accompanied by her daughter.  Ms. Melendez lives alone.      She started Entresto a few days ago. She tells me she is feeling much better.  This is the best she has felt in a while.  Lightheadedness has decreased.  Her weight has decreased.  Her energy has improved and her mind is more clear.  She has not had syncope.  She does not have chest pain.    Social history: Lives alone.  She does not smoke and drinks alcohol on occasion.      PHYSICAL EXAMINATION:  Vital  signs: 129/68, 81, 55.3 kg, BMI 24.6  General:  in no apparent distress  ENT/Mouth:  no nasal discharge.  Eyes:  normal conjunctivae.   Neck:  no thyromegaly or lymphadenopathy.  Chest/Lungs:  patient is not dyspneic.  Lungs CTA, without rales or wheezing  Cardiovascular: Normal JVP, rhythm is irregular in the 70s.  No gallop, murmur or rub.    Abdomen:  no abdominal distention.  Soft, negative HJR.    Extremities:  no edema  Skin:  no xanthelasma.    Neurologic:  alert & oriented x 3.  No tremor.    Vascular:  2+ carotids without bruits.  2+ radials.        DIAGNOSTIC STUDIES:  Laboratory studies: Creatinine 1.1, sodium 139, potassium 3.7, calcium 9.7  ECG 05/05/2023): AF with VR in the 40s, RBBB, left axis, low voltage  Echocardiogram (03/2023): EF 45-50% with mild global LV hypokinesis.  No significant valvular abnormalities.  Normal IVC.  Cardiac catheterization (01/2019).  Calcium score 319 (77th percentile), mild nonobstructive CAD.      IMPRESSION:  1. Permanent atrial fibrillation.  Rate was reportedly fast at the time of her initial presentation in 11/2022.  She later developed bradycardia on digoxin + carvedilol (she used to take carvedilol as high as 50 mg twice daily).  She is feeling much better off digoxin and on lower dose carvedilol 25 mg twice daily.  No clear indication for a permanent pacemaker at this time.  2. History of cardioembolic stroke.  [id number] is at least 6.  Continue Eliquis.  3. Nonsustained VT.  34-beat monomorphic VT run was recorded on 4/18/2023.  Unclear if symptomatic. NSVT of this duration is concerning in the setting of chronic cardiomyopathy.  However, EF is only mildly reduced at 45-50% and she is already on a beta-blocker.  There is no solid indication for a diagnostic EP study, antiarrhythmic drug therapy and/or ICD.  4. Cardiomyopathy/CHF.  Symptomatically she feels much improved currently.    RECOMMENDATIONS:  1. No need to adjust medications at this time.  Rate  control appears acceptable (resting heart rate was in the 70s today).  2. No further treatment for NSVT.  Continue medical therapy for cardiomyopathy.  3. Follow-up with the [facility] team and Dr. Smith.    It was my pleasure seeing this delightful patient.  Please feel free to call with any questions.   Total time spent today, including time for extensive chart review and documentation, was >60 minutes.    Zach Ventura MD, [facility]        CURRENT MEDICATIONS:  Current Outpatient Medications   Medication Sig Dispense Refill     Acetaminophen (TYLENOL PO) Take 500 mg by mouth every 6 hours as needed for mild pain or fever        amoxicillin (AMOXIL) 500 MG capsule TAKE 4 CAPSULES BY MOUTH 1 HOUR BEFORE DENTAL APPOINTMENT (Patient not taking: Reported on 6/1/2023)       apixaban ANTICOAGULANT (ELIQUIS) 5 MG tablet Take 1 tablet (5 mg) by mouth 2 times daily Restart eliquis 5/6/23 PM dose if no bleeding 180 tablet 3     benzonatate (TESSALON) 100 MG capsule Take 1 capsule (100 mg) by mouth 3 times daily as needed for cough (Patient not taking: Reported on 6/1/2023) 30 capsule 0     carvedilol (COREG) 25 MG tablet Take 1 tablet (25 mg) by mouth 2 times daily 180 tablet 3     cholecalciferol (VITAMIN D3) 25 mcg (1000 units) capsule Take 1 capsule by mouth daily       guaiFENesin-dextromethorphan (ROBITUSSIN DM) 100-10 MG/5ML syrup Take 10 mLs by mouth every 4 hours as needed for cough (Patient not taking: Reported on 6/1/2023) 118 mL 0     hydrALAZINE (APRESOLINE) 10 MG tablet Take 1 tablet (10 mg) by mouth 3 times daily 90 tablet 2     ipratropium - albuterol 0.5 mg/2.5 mg/3 mL (DUONEB) 0.5-2.5 (3) MG/3ML neb solution Take 1 vial (3 mLs) by nebulization every 4 hours as needed for shortness of breath, wheezing or cough 90 mL 0     Magnesium Oxide 250 MG TABS Take 1 tablet (250 mg) by mouth daily 30 tablet 1     methimazole (TAPAZOLE) 5 MG tablet Take 5 mg by mouth twice a week       omeprazole (PRILOSEC) 20 MG   capsule Take 20 mg by mouth daily       potassium chloride ER (K-TAB/KLOR-CON) 10 MEQ CR tablet Take 1 tablet (10 mEq) by mouth 2 times daily And as needed as directed. 180 tablet 3     sacubitril-valsartan (ENTRESTO) 49-51 MG per tablet Take 1 tablet by mouth 2 times daily 60 tablet 11     simvastatin (ZOCOR) 10 MG tablet Take 1 tablet (10 mg) by mouth At Bedtime 90 tablet 3     terazosin (HYTRIN) 5 MG capsule Take 1 capsule (5 mg) by mouth At Bedtime 90 capsule 0     torsemide (DEMADEX) 10 MG tablet Take 3 tablets (30 mg) by mouth daily 270 tablet 2       ALLERGIES     Allergies   Allergen Reactions     Amlodipine      swelling     Aspirin Other (See Comments)     Bleeding              Bleeding, GI Lesion         Codeine Sulfate GI Disturbance       PAST MEDICAL HISTORY:  Past Medical History:   Diagnosis Date     Arthritis      Breast cancer (H)      Cardiomyopathy (H)     ideopathic     Coronary artery disease 9/25/2014     Hypercholesterolemia      Hypertension      Hypokalemia      Malignant neoplasm (H)      Shortness of breath      Shoulder pain        PAST SURGICAL HISTORY:  Past Surgical History:   Procedure Laterality Date     BACK SURGERY       CARDIAC SURGERY      angiogram neg many yrs ago     CHOLECYSTECTOMY       CV CORONARY ANGIOGRAM N/A 5/5/2023    Procedure: Coronary Angiogram;  Surgeon: Kane Daugherty MD;  Location:  HEART CARDIAC CATH LAB     CV LEFT HEART CATH N/A 5/5/2023    Procedure: Left Heart Catheterization;  Surgeon: Kane Daugherty MD;  Location: [facility] CARDIAC CATH LAB      RIGHT HEART CATH MEASUREMENTS RECORDED N/A 5/5/2023    Procedure: Right Heart Catheterization;  Surgeon: Kane Daugherty MD;  Location: [facility] CARDIAC CATH LAB     CV RIGHT HEART EXERCISE STRESS STUDY N/A 5/5/2023    Procedure: Stress Drug Study;  Surgeon: Kane Daugherty MD;  Location: [facility] CARDIAC CATH LAB     ESOPHAGOSCOPY, GASTROSCOPY, DUODENOSCOPY (EGD), COMBINED N/A 6/22/2021     Procedure: ESOPHAGOGASTRODUODENOSCOPY, WITH ENDOSCOPIC US WITH FINE NEEDLE ASPIRATION;  Surgeon: Ventura Mcgill MD;  Location:  GI     EYE SURGERY       ORTHOPEDIC SURGERY      lt shoulder replaced, total     partial masectomy         FAMILY HISTORY:  Family History   Problem Relation Age of Onset     Other Cancer Mother      Other Cancer Brother      No Known Problems Son      Heart Failure Daughter      Family History Negative No family hx of        SOCIAL HISTORY:  Social History     Socioeconomic History     Marital status: Single   Tobacco Use     Smoking status: Never     Smokeless tobacco: Never   Substance and Sexual Activity     Alcohol use: Yes     Comment: socially     Drug use: No     Sexual activity: Never   Other Topics Concern     Special Diet No     Exercise No       CC  Heidi Haque NP  [address] [id number]               08-Oct-2022

## 2023-12-07 ENCOUNTER — ASOB RESULT (OUTPATIENT)
Age: 23
End: 2023-12-07

## 2023-12-07 ENCOUNTER — APPOINTMENT (OUTPATIENT)
Dept: OBGYN | Facility: CLINIC | Age: 23
End: 2023-12-07
Payer: COMMERCIAL

## 2023-12-07 VITALS
HEART RATE: 69 BPM | BODY MASS INDEX: 23.56 KG/M2 | WEIGHT: 138 LBS | DIASTOLIC BLOOD PRESSURE: 72 MMHG | SYSTOLIC BLOOD PRESSURE: 118 MMHG | HEIGHT: 64 IN

## 2023-12-07 DIAGNOSIS — N92.1 EXCESSIVE AND FREQUENT MENSTRUATION WITH IRREGULAR CYCLE: ICD-10-CM

## 2023-12-07 PROCEDURE — 99213 OFFICE O/P EST LOW 20 MIN: CPT | Mod: 25

## 2023-12-07 PROCEDURE — 76830 TRANSVAGINAL US NON-OB: CPT

## 2023-12-07 PROCEDURE — ZZZZZ: CPT

## 2023-12-11 RX ORDER — NORGESTIMATE AND ETHINYL ESTRADIOL 7DAYSX3 28
0.18/0.215/0.25 KIT ORAL DAILY
Qty: 3 | Refills: 3 | Status: ACTIVE | COMMUNITY
Start: 2020-07-14

## 2024-03-07 ENCOUNTER — NON-APPOINTMENT (OUTPATIENT)
Age: 24
End: 2024-03-07

## 2024-03-14 RX ORDER — DROSPIRENONE AND ETHINYL ESTRADIOL 0.02-3(28)
3-0.02 KIT ORAL DAILY
Qty: 3 | Refills: 3 | Status: ACTIVE | COMMUNITY
Start: 2024-03-14 | End: 1900-01-01

## 2024-04-25 ENCOUNTER — APPOINTMENT (OUTPATIENT)
Dept: OBGYN | Facility: CLINIC | Age: 24
End: 2024-04-25
Payer: COMMERCIAL

## 2024-04-25 VITALS
HEIGHT: 64 IN | HEART RATE: 79 BPM | WEIGHT: 142 LBS | SYSTOLIC BLOOD PRESSURE: 112 MMHG | BODY MASS INDEX: 24.24 KG/M2 | DIASTOLIC BLOOD PRESSURE: 72 MMHG

## 2024-04-25 DIAGNOSIS — Z30.40 ENCOUNTER FOR SURVEILLANCE OF CONTRACEPTIVES, UNSPECIFIED: ICD-10-CM

## 2024-04-25 DIAGNOSIS — Z01.419 ENCOUNTER FOR GYNECOLOGICAL EXAMINATION (GENERAL) (ROUTINE) W/OUT ABNORMAL FINDINGS: ICD-10-CM

## 2024-04-25 PROCEDURE — 99395 PREV VISIT EST AGE 18-39: CPT

## 2024-05-02 LAB
CYTOLOGY CVX/VAG DOC THIN PREP: NORMAL
HPV HIGH+LOW RISK DNA PNL CVX: NOT DETECTED

## 2024-05-24 ENCOUNTER — NON-APPOINTMENT (OUTPATIENT)
Age: 24
End: 2024-05-24

## 2024-06-05 NOTE — ED ADULT NURSE NOTE - NS ED NURSE DISCH DISPOSITION
Chief Complaint/Reason for Referral:  Thrombocytopenia      Vi Tinoco APRN Ohler, Robyn Ann, APRN    Records Obtained:  Records of the patients history including those obtained from  [facility] and patient information were reviewed and summarized in detail.    Subjective    History of Present Illness    Ms. Elias Baron is a very pleasant 59 year old  female who presents to hematology today for new patient evaluation for thrombocytopenia. New pateint referral is from her PCP: FRIDA Cifuentes.     PMH includes: hypothyroidism, GERD, hypertension, fatty liver, elevated liver enzymes, and obesity. Reports she is on Ozempic and has lost 12 pounds. She is trying to start exercising. Has noticed an occasional bruise on her arm, but denies any persistent bruising or bleeding.     [facility] and the platelet count has been trending downward for the last several months.     Blood work in April 2024 showed the platelet count down to 116,000. WBC count normal a t 6.0, hemoglobin normal at 13.1, MCV 92. Iron studies with normal iron of 104. Iron sat of 32%, B-12 508, folate of 18, liver enzymes  mildly elevated ALT / AST of 46 / 39, alk phos normal at 79, TSH 5.1909, free T4 0.95.  Previous labs in 12/2021 with WBC 6.1, hemoglobin of 14.5, platelet count 135. Prior colonoscopy 2/15/2016 was normal. Prior abdominal US in 7/19/2019 showed fatty liver with probably focal fatty sparing adjacent to the gallbladder.   Platelet count in June of 2021 was in the low normal range of 148.    Oncology/Hematology History    No history exists.       Review of Systems   Constitutional:  Positive for fatigue. Negative for appetite change, diaphoresis, fever, unexpected weight gain and unexpected weight loss.   HENT:  Negative for hearing loss, sore throat and voice change.    Eyes:  Negative for blurred vision, double vision, pain, redness and visual disturbance.   Respiratory:  Negative for  cough, shortness of breath and wheezing.    Cardiovascular:  Negative for chest pain, palpitations and leg swelling.   Endocrine: Negative for cold intolerance, heat intolerance, polydipsia and polyuria.   Genitourinary:  Negative for decreased urine volume, difficulty urinating, frequency and urinary incontinence.   Musculoskeletal:  Negative for arthralgias, back pain, joint swelling and myalgias.   Skin:  Negative for color change, rash, skin lesions and wound.   Neurological:  Negative for dizziness, seizures, numbness and headache.   Hematological:  Negative for adenopathy. Does not bruise/bleed easily.   Psychiatric/Behavioral:  Negative for depressed mood. The patient is not nervous/anxious.    All other systems reviewed and are negative.      Current Outpatient Medications on File Prior to Visit   Medication Sig Dispense Refill    fluticasone (FLONASE) 50 MCG/ACT nasal spray 1 spray into the nostril(s) as directed by provider Daily.      levothyroxine (SYNTHROID, LEVOTHROID) 75 MCG tablet Synthroid 75 mcg oral tablet take 1 tablet (75 mcg) by oral route once daily on an empty stomach 30 minutes before breakfast   Active      losartan (COZAAR) 25 MG tablet       pantoprazole (PROTONIX) 40 MG EC tablet Take 1 tablet by mouth Daily.      Semaglutide, 1 MG/DOSE, (Ozempic, 1 MG/DOSE,) 2 MG/1.5ML solution pen-injector Inject 1 mg under the skin into the appropriate area as directed 1 (One) Time Per Week.       No current facility-administered medications on file prior to visit.       Allergies   Allergen Reactions    Amoxicillin-Pot Clavulanate Diarrhea    Ciprofloxacin Rash    Empagliflozin Other (See Comments)     Other reaction(s): Genital infection    Glipizide Myalgia    Meperidine Diarrhea and Nausea And Vomiting    Metformin GI Intolerance     Other reaction(s): Gastroesophageal reflux disease    Nitrofurantoin Unknown - High Severity    Statins Nausea And Vomiting     Past Medical History:   Diagnosis Date     Allergic     Diabetes mellitus     Hypertension     Hypothyroidism      Past Surgical History:   Procedure Laterality Date    JOINT REPLACEMENT      TUBAL ABDOMINAL LIGATION       Social History     Socioeconomic History    Marital status:    Tobacco Use    Smoking status: Never    Smokeless tobacco: Never   Vaping Use    Vaping status: Never Used   Substance and Sexual Activity    Alcohol use: Never    Drug use: Never    Sexual activity: Defer     Family History   Problem Relation Age of Onset    Diabetes Father     Diabetes Maternal Grandfather      Immunization History   Administered Date(s) Administered    Anthrax 09/19/2011, 10/21/2011    COVID-19 (MODERNA) 1st,2nd,3rd Dose Monovalent 02/08/2021, 03/08/2021, 11/19/2021    Flu Vaccine Intradermal Quad 18-64YR 11/02/2020    Flublok 18+yrs 10/20/2021    Fluzone (or Fluarix & Flulaval for VFC) >6mos 10/01/2019    Fluzone Quad >6mos (Multi-dose) 10/01/2019    H1N1 Inj 03/06/2010    Hep A / Hep B 02/15/2023    Hepatitis A 11/06/2005, 03/09/2007    Hepatitis B Adult/Adolescent IM 11/06/2005, 03/09/2007, 09/04/2007    Influenza Injectable Mdck Pf Quad 11/02/2020, 10/10/2022    Influenza MDCK Quadrivalent with Preserative 10/17/2019    Influenza Quad Vaccine (Inpatient) 10/21/2011    Influenza Seasonal Injectable 09/13/2012, 10/06/2014, 10/05/2015, 10/01/2016, 10/01/2017, 10/01/2018    Influenza, Unspecified 11/01/2012, 10/01/2020, 10/21/2022, 10/01/2023    Pneumococcal Conjugate 20-Valent (PCV20) 07/20/2022    Pneumococcal Polysaccharide (PPSV23) 10/04/2017    Shingrix 07/01/2021, 12/28/2021, 07/12/2022    Smallpox 09/19/2011    Td (TDVAX) 11/03/1988, 11/06/2005    Tdap 09/19/2011, 11/01/2012, 11/08/2022    Typhoid Inactivated 09/19/2011    Typhoid, Unspecified 08/04/1990    Yellow Fever 08/04/1990    influenza Split 11/06/2005, 10/13/2008, 10/15/2009, 10/25/2010       Tobacco Use: Low Risk  (6/5/2024)    Patient History     Smoking Tobacco Use: Never      Smokeless Tobacco Use: Never     Passive Exposure: Not on file       Objective     Physical Exam  Vitals and nursing note reviewed.   Constitutional:       Appearance: Normal appearance. She is obese.   HENT:      Head: Normocephalic.      Nose: Nose normal.      Mouth/Throat:      Mouth: Mucous membranes are moist.   Eyes:      Pupils: Pupils are equal, round, and reactive to light.   Cardiovascular:      Rate and Rhythm: Normal rate and regular rhythm.      Pulses: Normal pulses.      Heart sounds: Normal heart sounds. No murmur heard.  Pulmonary:      Effort: Pulmonary effort is normal. No respiratory distress.      Breath sounds: Normal breath sounds. No wheezing, rhonchi or rales.   Abdominal:      General: Bowel sounds are normal. There is no distension.      Palpations: Abdomen is soft.      Tenderness: There is no abdominal tenderness.   Musculoskeletal:         General: Normal range of motion.      Cervical back: Normal range of motion and neck supple.   Skin:     General: Skin is warm and dry.      Capillary Refill: Capillary refill takes less than 2 seconds.   Neurological:      General: No focal deficit present.      Mental Status: She is alert and oriented to person, place, and time.   Psychiatric:         Mood and Affect: Mood normal.         Behavior: Behavior normal.         Thought Content: Thought content normal.         Judgment: Judgment normal.         Vitals:    06/05/24 0906   BP: 137/71   Pulse: 88   SpO2: 96%   Weight: 110 kg (242 lb)       Wt Readings from Last 3 Encounters:   06/05/24 110 kg (242 lb)   04/16/24 110 kg (243 lb 3.2 oz)   03/05/24 109 kg (240 lb 6.4 oz)                 ECOG score: 0         ECOG: (0) Fully Active - Able to Carry On All Pre-disease Performance Without Restriction  Fall Risk Assessment was completed, and patient is at low risk for falls.  PHQ-9 Total Score: 0       The patient is  experiencing fatigue. Fatigue score: 5    PT/OT Functional Screening: PT fx  "screen : No needs identified  Speech Functional Screening: Speech fx screen : No needs identified  Rehab to be ordered: Rehab to be ordered : No needs identified        Result Review :  The following data was reviewed by: FRIDA Edwards on 06/05/2024:  Lab Results   Component Value Date    HGB 14.0 09/11/2019    HCT 41.3 09/11/2019    MCV 92.6 09/11/2019     09/11/2019    WBC 6.19 09/11/2019    NEUTROABS 3.74 09/11/2019    LYMPHSABS 1.70 09/11/2019    MONOSABS 0.50 09/11/2019    EOSABS 0.17 09/11/2019    BASOSABS 0.05 09/11/2019     Lab Results   Component Value Date    GLUCOSE 155 (H) 09/11/2019    BUN 13 09/11/2019    CREATININE 0.69 09/11/2019     09/11/2019    K 4.2 09/11/2019     09/11/2019    CO2 22 09/11/2019    CALCIUM 8.6 (L) 09/11/2019    PROTEINTOT 6.9 09/11/2019    ALBUMIN 4.1 09/11/2019    BILITOT 0.41 09/11/2019    ALKPHOS 83 09/11/2019    AST 51 (H) 09/11/2019    ALT 60 (H) 09/11/2019        No results found for: \"IRON\", \"LABIRON\", \"TRANSFERRIN\", \"TIBC\"  No results found for: \"LDH\", \"FERRITIN\", \"GGFLGDZX85\", \"FOLATE\"  No results found for: \"PSA\", \"CEA\", \"AFP\", \"\", \"\"    No Images in the past 120 days found..         Assessment and Plan:  Diagnoses and all orders for this visit:    1. Thrombocytopenia (Primary)  -     CBC & Differential; Future  -     Comprehensive Metabolic Panel; Future  -     Peripheral Blood Smear; Future  -     CBC & Differential; Future  -     Comprehensive Metabolic Panel; Future    2. Elevated liver function tests  -     CBC & Differential; Future  -     Comprehensive Metabolic Panel; Future  -     Peripheral Blood Smear; Future  -     CBC & Differential; Future  -     Comprehensive Metabolic Panel; Future    Mild thrombocytopenia in the range of 148 to 116 since at least June of 2021. Intermittently mildly elevated liver enzymes. Prior US in 2019 showed fatty liver. Likely, mild thrombocytopenia is secondary to fatty liver. She has  " platelet count at this time above 100,000 which is adequate for hemostasis unless she starts to drop to below 50,000 or has persistent acute bleeding of any sorts. Discussed dietary changes and exercise which she has lost 12 pounds using Ozempic and wants to start exercising.     Will recheck CBC, CMP today as well as peripheral smear. Follow up in 4 months with MD.     I spent 30 minutes caring for Elias on this date of service. This time includes time spent by me in the following activities:preparing for the visit, reviewing tests, obtaining and/or reviewing a separately obtained history, performing a medically appropriate examination and/or evaluation , counseling and educating the patient/family/caregiver, ordering medications, tests, or procedures, referring and communicating with other health care professionals , documenting information in the medical record, and independently interpreting results and communicating that information with the patient/family/caregiver    Patient Follow Up: 4 months with CBC and follow up.     Patient was given instructions and counseling regarding her condition or for health maintenance advice. Please see specific information pulled into the AVS if appropriate.     Berenice Grey, APRN    6/5/2024    .[email or web address]       Discharged

## 2024-06-07 ENCOUNTER — NON-APPOINTMENT (OUTPATIENT)
Age: 24
End: 2024-06-07

## 2025-01-28 ENCOUNTER — NON-APPOINTMENT (OUTPATIENT)
Age: 25
End: 2025-01-28

## 2025-04-02 ENCOUNTER — NON-APPOINTMENT (OUTPATIENT)
Age: 25
End: 2025-04-02

## 2025-04-03 ENCOUNTER — APPOINTMENT (OUTPATIENT)
Dept: OBGYN | Facility: CLINIC | Age: 25
End: 2025-04-03
Payer: COMMERCIAL

## 2025-04-03 ENCOUNTER — ASOB RESULT (OUTPATIENT)
Age: 25
End: 2025-04-03

## 2025-04-03 ENCOUNTER — NON-APPOINTMENT (OUTPATIENT)
Age: 25
End: 2025-04-03

## 2025-04-03 ENCOUNTER — APPOINTMENT (OUTPATIENT)
Dept: OBGYN | Facility: CLINIC | Age: 25
End: 2025-04-03

## 2025-04-03 VITALS
DIASTOLIC BLOOD PRESSURE: 71 MMHG | BODY MASS INDEX: 24.07 KG/M2 | HEART RATE: 78 BPM | WEIGHT: 141 LBS | SYSTOLIC BLOOD PRESSURE: 112 MMHG | HEIGHT: 64 IN

## 2025-04-03 DIAGNOSIS — Z01.419 ENCOUNTER FOR GYNECOLOGICAL EXAMINATION (GENERAL) (ROUTINE) W/OUT ABNORMAL FINDINGS: ICD-10-CM

## 2025-04-03 DIAGNOSIS — N92.1 EXCESSIVE AND FREQUENT MENSTRUATION WITH IRREGULAR CYCLE: ICD-10-CM

## 2025-04-03 PROCEDURE — 99395 PREV VISIT EST AGE 18-39: CPT

## 2025-04-07 LAB — HPV HIGH+LOW RISK DNA PNL CVX: NOT DETECTED

## 2025-04-09 LAB — CYTOLOGY CVX/VAG DOC THIN PREP: NORMAL

## 2025-04-23 ENCOUNTER — NON-APPOINTMENT (OUTPATIENT)
Age: 25
End: 2025-04-23